# Patient Record
Sex: FEMALE | URBAN - METROPOLITAN AREA
[De-identification: names, ages, dates, MRNs, and addresses within clinical notes are randomized per-mention and may not be internally consistent; named-entity substitution may affect disease eponyms.]

---

## 2022-07-01 ENCOUNTER — OFFICE VISIT (OUTPATIENT)
Dept: URBAN - METROPOLITAN AREA CLINIC 72 | Facility: CLINIC | Age: 77
End: 2022-07-01

## 2022-08-03 ENCOUNTER — WEB ENCOUNTER (OUTPATIENT)
Dept: URBAN - METROPOLITAN AREA CLINIC 113 | Facility: CLINIC | Age: 77
End: 2022-08-03

## 2022-08-03 ENCOUNTER — OFFICE VISIT (OUTPATIENT)
Dept: URBAN - METROPOLITAN AREA CLINIC 113 | Facility: CLINIC | Age: 77
End: 2022-08-03

## 2022-08-05 ENCOUNTER — OFFICE VISIT (OUTPATIENT)
Dept: URBAN - METROPOLITAN AREA CLINIC 113 | Facility: CLINIC | Age: 77
End: 2022-08-05
Payer: MEDICARE

## 2022-08-05 VITALS
HEIGHT: 61 IN | TEMPERATURE: 98 F | SYSTOLIC BLOOD PRESSURE: 149 MMHG | HEART RATE: 74 BPM | WEIGHT: 167 LBS | DIASTOLIC BLOOD PRESSURE: 70 MMHG | BODY MASS INDEX: 31.53 KG/M2

## 2022-08-05 DIAGNOSIS — Z79.899 HIGH RISK MEDICATIONS (NOT ANTICOAGULANTS) LONG-TERM USE: ICD-10-CM

## 2022-08-05 DIAGNOSIS — K21.9 GERD WITHOUT ESOPHAGITIS: ICD-10-CM

## 2022-08-05 DIAGNOSIS — K44.9 HIATAL HERNIA: ICD-10-CM

## 2022-08-05 PROCEDURE — 99203 OFFICE O/P NEW LOW 30 MIN: CPT | Performed by: INTERNAL MEDICINE

## 2022-08-05 RX ORDER — PREDNISONE 5 MG/1
1 TABLET TABLET ORAL ONCE A DAY
Status: ACTIVE | COMMUNITY

## 2022-08-05 RX ORDER — PANTOPRAZOLE SODIUM 20 MG/1
1 TABLET TABLET, DELAYED RELEASE ORAL ONCE A DAY
Status: ACTIVE | COMMUNITY

## 2022-08-05 RX ORDER — CLOPIDOGREL BISULFATE 75 MG/1
1 TABLET TABLET ORAL ONCE A DAY
Status: ACTIVE | COMMUNITY

## 2022-08-05 RX ORDER — LEVOTHYROXINE SODIUM 100 UG/1
1 CAPSULE IN THE MORNING ON AN EMPTY STOMACH CAPSULE ORAL ONCE A DAY
Status: ACTIVE | COMMUNITY

## 2022-08-05 RX ORDER — FAMOTIDINE 20 MG/1
1 TABLET AT BEDTIME AS NEEDED TABLET, FILM COATED ORAL ONCE A DAY
Qty: 30 | OUTPATIENT
Start: 2022-08-25

## 2022-08-05 RX ORDER — GINGER ROOT/GINGER ROOT EXT 262.5 MG
1 TABLET WITH A MEAL CAPSULE ORAL ONCE A DAY
Status: ACTIVE | COMMUNITY

## 2022-08-05 RX ORDER — PRAVASTATIN SODIUM 10 MG/1
1 TABLET TABLET ORAL ONCE A DAY
Status: ACTIVE | COMMUNITY

## 2022-08-05 RX ORDER — TETRACYCLINE HCL 500 MG
AS DIRECTED CAPSULE ORAL
Status: ACTIVE | COMMUNITY

## 2022-08-05 RX ORDER — LOSARTAN POTASSIUM 25 MG/1
1 TABLET TABLET ORAL ONCE A DAY
Status: ACTIVE | COMMUNITY

## 2022-08-05 RX ORDER — METHOTREXATE 25 MG/ML
AS DIRECTED INJECTION, SOLUTION INTRA-ARTERIAL; INTRAMUSCULAR; INTRAVENOUS
Status: ACTIVE | COMMUNITY

## 2022-08-05 RX ORDER — PANTOPRAZOLE SODIUM 20 MG/1
1 TABLET TABLET, DELAYED RELEASE ORAL ONCE A DAY
OUTPATIENT

## 2022-08-05 RX ORDER — FOLIC ACID 1 MG/1
1 TABLET TABLET ORAL ONCE A DAY
Status: ACTIVE | COMMUNITY

## 2022-08-05 RX ORDER — CYANOCOBALAMIN (VITAMIN B-12) 500 MCG
1 TABLET TABLET ORAL ONCE A DAY
Status: ACTIVE | COMMUNITY

## 2022-08-05 NOTE — HPI-TODAY'S VISIT:
Ms. Wen is a 76-year-old woman with a past medical history of peripheral vascular disease on Plavix and rheumatoid arthritis on prednisone and methotrexate presenting for evaluation regarding hiatal hernia and GERD.  She presents records for review.  She had an EGD by Dr. Gil at [facility] on 7/1/2022 that was notable for 7 cm hiatal hernia (Hill grade 4) with no evidence of Rivera's esophagus or esophagitis and duodenal diverticulum.  She was recommended to consider LINX versus hiatal hernia repair with TIF pending esophageal manometry depending on her clinical course.  She had a CT abdomen pelvis with contrast on 5/12/2022 that was unremarkable (no report of a hiatal hernia) she had a EGD and colonoscopy by Dr. Del Castillo in [facility] on 1/15/2013.  EGD with for a 5 sonometer hiatal hernia but no other abnormalities.  He colonoscopy and a excellent bowel prep and was normal.  She reports having GERD for many years, but in the last 1 to 2 years increased amounts of gastroesophageal reflux.  She denies any dysphagia.  Her symptoms are fairly well controlled on pantoprazole 20 mg daily, but she does experience occasional nocturnal symptoms.  She denies any abdominal pain, nausea, vomiting.  Bowel habits are regular normal consistency.  She has occasional bouts of diarrhea after eating fried foods.  No melena or red blood per rectum.  She recently had a stool Cologuard test that was negative.  She is requesting an opinion regarding whether or not she should proceed forward with antireflux surgery.

## 2023-04-21 ENCOUNTER — LAB OUTSIDE AN ENCOUNTER (OUTPATIENT)
Dept: URBAN - METROPOLITAN AREA CLINIC 113 | Facility: CLINIC | Age: 78
End: 2023-04-21

## 2023-04-21 ENCOUNTER — OFFICE VISIT (OUTPATIENT)
Dept: URBAN - METROPOLITAN AREA CLINIC 113 | Facility: CLINIC | Age: 78
End: 2023-04-21
Payer: MEDICARE

## 2023-04-21 VITALS
BODY MASS INDEX: 29.64 KG/M2 | RESPIRATION RATE: 16 BRPM | DIASTOLIC BLOOD PRESSURE: 61 MMHG | HEIGHT: 61 IN | SYSTOLIC BLOOD PRESSURE: 137 MMHG | HEART RATE: 71 BPM | TEMPERATURE: 97.7 F | WEIGHT: 157 LBS

## 2023-04-21 DIAGNOSIS — K44.9 HIATAL HERNIA: ICD-10-CM

## 2023-04-21 DIAGNOSIS — K21.9 GERD WITHOUT ESOPHAGITIS: ICD-10-CM

## 2023-04-21 DIAGNOSIS — R11.0 NAUSEA: ICD-10-CM

## 2023-04-21 PROCEDURE — 99214 OFFICE O/P EST MOD 30 MIN: CPT | Performed by: NURSE PRACTITIONER

## 2023-04-21 RX ORDER — LEVOTHYROXINE SODIUM 100 UG/1
1 CAPSULE IN THE MORNING ON AN EMPTY STOMACH CAPSULE ORAL ONCE A DAY
Status: ACTIVE | COMMUNITY

## 2023-04-21 RX ORDER — FOLIC ACID 1 MG/1
1 TABLET TABLET ORAL ONCE A DAY
Status: ACTIVE | COMMUNITY

## 2023-04-21 RX ORDER — GINGER ROOT/GINGER ROOT EXT 262.5 MG
1 TABLET WITH A MEAL CAPSULE ORAL ONCE A DAY
Status: ACTIVE | COMMUNITY

## 2023-04-21 RX ORDER — METHOTREXATE 25 MG/ML
AS DIRECTED INJECTION, SOLUTION INTRA-ARTERIAL; INTRAMUSCULAR; INTRAVENOUS
Status: ACTIVE | COMMUNITY

## 2023-04-21 RX ORDER — PREDNISONE 5 MG/1
1 TABLET TABLET ORAL ONCE A DAY
Status: ACTIVE | COMMUNITY

## 2023-04-21 RX ORDER — LOSARTAN POTASSIUM 25 MG/1
1 TABLET TABLET ORAL ONCE A DAY
Status: ACTIVE | COMMUNITY

## 2023-04-21 RX ORDER — CLOPIDOGREL BISULFATE 75 MG/1
1 TABLET TABLET ORAL ONCE A DAY
Status: ACTIVE | COMMUNITY

## 2023-04-21 RX ORDER — FAMOTIDINE 20 MG/1
1 TABLET AT BEDTIME AS NEEDED TABLET, FILM COATED ORAL ONCE A DAY
Qty: 30 | Status: ON HOLD | COMMUNITY
Start: 2022-08-25

## 2023-04-21 RX ORDER — TETRACYCLINE HCL 500 MG
AS DIRECTED CAPSULE ORAL
Status: ACTIVE | COMMUNITY

## 2023-04-21 RX ORDER — ONDANSETRON 8 MG/1
1 TABLET ON THE TONGUE AND ALLOW TO DISSOLVE  AS NEEDED TABLET, ORALLY DISINTEGRATING ORAL EVERY 6 HOURS
Qty: 60 TABLETS | Refills: 3 | OUTPATIENT
Start: 2023-04-21

## 2023-04-21 RX ORDER — PANTOPRAZOLE SODIUM 20 MG/1
1 TABLET TABLET, DELAYED RELEASE ORAL ONCE A DAY
Status: ACTIVE | COMMUNITY

## 2023-04-21 RX ORDER — CYANOCOBALAMIN (VITAMIN B-12) 500 MCG
1 TABLET TABLET ORAL ONCE A DAY
Status: ACTIVE | COMMUNITY

## 2023-04-21 RX ORDER — PRAVASTATIN SODIUM 10 MG/1
1 TABLET TABLET ORAL ONCE A DAY
Status: ACTIVE | COMMUNITY

## 2023-04-21 RX ORDER — PANTOPRAZOLE SODIUM 40 MG/1
1 TABLET TABLET, DELAYED RELEASE ORAL ONCE A DAY
Qty: 30 TABLET | Refills: 3 | OUTPATIENT
Start: 2023-04-21

## 2023-04-21 NOTE — HPI-TODAY'S VISIT:
Ms. Wen is a 77-year-old woman with a past medical history of peripheral vascular disease on Plavix and rheumatoid arthritis on prednisone and methotrexate presenting for ER follow-up of abdominal pain and nausea.

## 2023-04-21 NOTE — HPI-TODAY'S VISIT:
She was last seen 8/5/2022 for evaluation of a large hiatal hernia with GERD, but no esophagitis or Rivera's esophagus.  Her symptoms were fairly well controlled with pantoprazole 20 mg daily.  She declined consideration for hiatal hernia repair or antireflux surgery at that time.  She was recommended a trial of nightly famotidine for occasional breakthrough symptoms. Per review of records, she was seen in the ER at [facility] on 4/19/2023 due to epigastric pain and nausea.  Lab evaluation at that time, to include CBC, CMP, lipase, troponin level, and urinalysis, was unremarkable.  She had an EKG and abdominal xray at that time which also showed no acute process. Her symptoms were ultimately attributed to GERD. She states she has been nauseous since Tuesday night, three days ago. She took Zofran at home and was unrelieved. She has experienced a constant queasiness in the upper abdomen, but has not vomited. She was seen in the ER as above and given IV Pepcid and Zofran with little relief. She denies any reflux symptoms on pantoprazole. Her bowel habits are regular without blood per rectum. She is following a bland diet and eating very little, as most food exacerbates her nausea. Interestingly, over the last month, she has experienced an exacerbation of nausea every Tuesday following administration of her methotrexate on Monday. This typically responds to ODT ondansetron 4mg, however, this week her symptoms have persisted. She denies NSAID use. She does report poor dietary habits prior to onset, including consumption of fried chicken, french fries, and a [employer] hotdog.

## 2023-04-21 NOTE — HPI-OTHER HISTORIES
Negative Cologuard 2022. She presents records for review.  She had an EGD by Dr. Gil at [facility] on 7/1/2022 that was notable for 7 cm hiatal hernia (Hill grade 4) with no evidence of Rivera's esophagus or esophagitis and duodenal diverticulum.  She was recommended to consider LINX versus hiatal hernia repair with TIF pending esophageal manometry depending on her clinical course.  She had a CT abdomen pelvis with contrast on 5/12/2022 that was unremarkable (no report of a hiatal hernia) she had a EGD and colonoscopy by Dr. Del Castillo in [facility] on 1/15/2013.  EGD with for a 5 sonometer hiatal hernia but no other abnormalities.  He colonoscopy and a excellent bowel prep and was normal.

## 2023-06-01 ENCOUNTER — OFFICE VISIT (OUTPATIENT)
Dept: URBAN - METROPOLITAN AREA CLINIC 113 | Facility: CLINIC | Age: 78
End: 2023-06-01

## 2023-06-12 ENCOUNTER — OFFICE VISIT (OUTPATIENT)
Dept: URBAN - METROPOLITAN AREA CLINIC 113 | Facility: CLINIC | Age: 78
End: 2023-06-12
Payer: MEDICARE

## 2023-06-12 VITALS
HEART RATE: 75 BPM | HEIGHT: 61 IN | BODY MASS INDEX: 29.64 KG/M2 | DIASTOLIC BLOOD PRESSURE: 66 MMHG | WEIGHT: 157 LBS | SYSTOLIC BLOOD PRESSURE: 154 MMHG | TEMPERATURE: 97.3 F

## 2023-06-12 DIAGNOSIS — K76.0 FATTY LIVER: ICD-10-CM

## 2023-06-12 DIAGNOSIS — K21.9 GASTRO-ESOPHAGEAL REFLUX DISEASE WITHOUT ESOPHAGITIS: ICD-10-CM

## 2023-06-12 DIAGNOSIS — K44.9 DIAPHRAGMATIC HERNIA WITHOUT OBSTRUCTION OR GANGRENE: ICD-10-CM

## 2023-06-12 PROCEDURE — 99213 OFFICE O/P EST LOW 20 MIN: CPT | Performed by: INTERNAL MEDICINE

## 2023-06-12 RX ORDER — LEVOTHYROXINE SODIUM 100 UG/1
1 CAPSULE IN THE MORNING ON AN EMPTY STOMACH CAPSULE ORAL ONCE A DAY
Status: ON HOLD | COMMUNITY

## 2023-06-12 RX ORDER — PANTOPRAZOLE SODIUM 20 MG/1
1 TABLET TABLET, DELAYED RELEASE ORAL TWICE A DAY
Qty: 180 TABLET | Refills: 3 | OUTPATIENT

## 2023-06-12 RX ORDER — LOSARTAN POTASSIUM 25 MG/1
1 TABLET TABLET ORAL ONCE A DAY
Status: ACTIVE | COMMUNITY

## 2023-06-12 RX ORDER — FAMOTIDINE 20 MG/1
1 TABLET AT BEDTIME AS NEEDED TABLET, FILM COATED ORAL ONCE A DAY
Qty: 30 | Status: ON HOLD | COMMUNITY
Start: 2022-08-25

## 2023-06-12 RX ORDER — PANTOPRAZOLE SODIUM 40 MG/1
1 TABLET TABLET, DELAYED RELEASE ORAL ONCE A DAY
Qty: 30 TABLET | Refills: 3 | Status: ON HOLD | COMMUNITY
Start: 2023-04-21

## 2023-06-12 RX ORDER — TETRACYCLINE HCL 500 MG
AS DIRECTED CAPSULE ORAL
Status: ACTIVE | COMMUNITY

## 2023-06-12 RX ORDER — METHOTREXATE 25 MG/ML
AS DIRECTED INJECTION, SOLUTION INTRA-ARTERIAL; INTRAMUSCULAR; INTRAVENOUS
Status: ACTIVE | COMMUNITY

## 2023-06-12 RX ORDER — CYANOCOBALAMIN (VITAMIN B-12) 500 MCG
1 TABLET TABLET ORAL ONCE A DAY
Status: ACTIVE | COMMUNITY

## 2023-06-12 RX ORDER — ONDANSETRON 8 MG/1
1 TABLET ON THE TONGUE AND ALLOW TO DISSOLVE  AS NEEDED TABLET, ORALLY DISINTEGRATING ORAL EVERY 6 HOURS
Qty: 60 TABLETS | Refills: 3 | Status: ACTIVE | COMMUNITY
Start: 2023-04-21

## 2023-06-12 RX ORDER — PREDNISONE 5 MG/1
1 TABLET TABLET ORAL ONCE A DAY
Status: ACTIVE | COMMUNITY

## 2023-06-12 RX ORDER — CLOPIDOGREL BISULFATE 75 MG/1
1 TABLET TABLET ORAL ONCE A DAY
Status: ACTIVE | COMMUNITY

## 2023-06-12 RX ORDER — FOLIC ACID 1 MG/1
1 TABLET TABLET ORAL ONCE A DAY
Status: ACTIVE | COMMUNITY

## 2023-06-12 RX ORDER — PANTOPRAZOLE SODIUM 20 MG/1
1 TABLET TABLET, DELAYED RELEASE ORAL ONCE A DAY
Status: ACTIVE | COMMUNITY

## 2023-06-12 RX ORDER — GINGER ROOT/GINGER ROOT EXT 262.5 MG
1 TABLET WITH A MEAL CAPSULE ORAL ONCE A DAY
Status: ACTIVE | COMMUNITY

## 2023-06-12 RX ORDER — PRAVASTATIN SODIUM 10 MG/1
1 TABLET TABLET ORAL ONCE A DAY
Status: ACTIVE | COMMUNITY

## 2023-06-12 NOTE — HPI-TODAY'S VISIT:
Ms. Wen is a 77-year-old woman with a past medical history of peripheral vascular disease on Plavix and rheumatoid arthritis on prednisone and methotrexate presenting for follow up.  She was last seen on 4/21/2023 for follow-up after ER visit for worsening nausea and GERD.  She is recommended to have an upper GI series and abdominal ultrasound, and she was started on pantoprazole 40 mg daily for GERD, and Zofran for nausea.  The abdominal ultrasound on 4/28/2023 showed changes of hepatic steatosis and bilateral renal cysts.  The upper GI series on 5/5/1932 was notable for a moderate size hiatal hernia and mild distal thoracic esophageal spasm.  She returns for follow-up reporting that she is overall feeling better since she has intentionally lost weight with avoiding fried foods and making other healthier choices with meals.  She denies any nausea, vomiting or heartburn.  She has not been using Zofran.  She has not been taking any NSAIDs, but may use up to Tylenol 3 g daily for relief of musculoskeletal pain.  She is taking pantoprazole 20 mg twice daily.  No dysphagia.  She has taken Pepcid in the past without any relief of GERD symptoms.  Her bowel habits are fairly regular normal consistency.  No melena or red blood per rectum.

## 2023-06-12 NOTE — HPI-OTHER HISTORIES
Negative Cologuard 2022. She presents records for review.  She had an EGD by Dr. Gil at [facility] on 7/1/2022 that was notable for 7 cm hiatal hernia (Hill grade 4) with no evidence of Rivera's esophagus or esophagitis and duodenal diverticulum.  She was recommended to consider LINX versus hiatal hernia repair with TIF pending esophageal manometry depending on her clinical course.  She had a CT abdomen pelvis with contrast on 5/12/2022 that was unremarkable (no report of a hiatal hernia) she had a EGD and colonoscopy by Dr. Del Castillo in [facility] on 1/15/2013.  EGD with for a 5 sonometer hiatal hernia but no other abnormalities.  He colonoscopy and a excellent bowel prep and was normal.

## 2023-06-13 ENCOUNTER — OFFICE VISIT (OUTPATIENT)
Dept: URBAN - METROPOLITAN AREA CLINIC 113 | Facility: CLINIC | Age: 78
End: 2023-06-13

## 2023-06-22 ENCOUNTER — TELEPHONE ENCOUNTER (OUTPATIENT)
Dept: URBAN - METROPOLITAN AREA CLINIC 113 | Facility: CLINIC | Age: 78
End: 2023-06-22

## 2023-06-22 RX ORDER — PANTOPRAZOLE SODIUM 20 MG/1
1 TABLET TABLET, DELAYED RELEASE ORAL TWICE DAILY
Qty: 180 | Refills: 3

## 2023-06-29 PROBLEM — 371132002: Status: ACTIVE | Noted: 2023-06-29

## 2023-06-29 PROBLEM — 235595009: Status: ACTIVE | Noted: 2023-06-29

## 2023-06-29 PROBLEM — 197321007: Status: ACTIVE | Noted: 2023-06-29

## 2023-07-19 ENCOUNTER — ERX REFILL RESPONSE (OUTPATIENT)
Dept: URBAN - METROPOLITAN AREA CLINIC 113 | Facility: CLINIC | Age: 78
End: 2023-07-19

## 2023-07-19 RX ORDER — PANTOPRAZOLE SODIUM 40 MG/1
1 TABLET TABLET, DELAYED RELEASE ORAL ONCE A DAY
Qty: 30 TABLET | Refills: 3 | OUTPATIENT

## 2023-07-19 RX ORDER — PANTOPRAZOLE SODIUM 40 MG/1
TAKE 1 TABLET BY MOUTH EVERY DAY FOR 30 DAYS TABLET, DELAYED RELEASE ORAL
Qty: 90 TABLET | Refills: 3 | OUTPATIENT

## 2023-11-06 ENCOUNTER — LAB OUTSIDE AN ENCOUNTER (OUTPATIENT)
Dept: URBAN - METROPOLITAN AREA CLINIC 113 | Facility: CLINIC | Age: 78
End: 2023-11-06

## 2024-03-26 ENCOUNTER — LAB (OUTPATIENT)
Dept: URBAN - METROPOLITAN AREA CLINIC 113 | Facility: CLINIC | Age: 79
End: 2024-03-26

## 2024-03-26 ENCOUNTER — OV EP (OUTPATIENT)
Dept: URBAN - METROPOLITAN AREA CLINIC 113 | Facility: CLINIC | Age: 79
End: 2024-03-26

## 2024-03-26 VITALS
RESPIRATION RATE: 18 BRPM | TEMPERATURE: 97.8 F | HEIGHT: 61 IN | SYSTOLIC BLOOD PRESSURE: 174 MMHG | BODY MASS INDEX: 27 KG/M2 | WEIGHT: 143 LBS | HEART RATE: 74 BPM | DIASTOLIC BLOOD PRESSURE: 74 MMHG

## 2024-03-26 RX ORDER — PANTOPRAZOLE SODIUM 20 MG/1
1 TABLET TABLET, DELAYED RELEASE ORAL TWICE DAILY
Qty: 180 | Refills: 3 | Status: ACTIVE | COMMUNITY

## 2024-03-26 RX ORDER — ONDANSETRON 8 MG/1
1 TABLET ON THE TONGUE AND ALLOW TO DISSOLVE  AS NEEDED TABLET, ORALLY DISINTEGRATING ORAL EVERY 6 HOURS
Qty: 60 TABLETS | Refills: 3 | Status: ACTIVE | COMMUNITY
Start: 2023-04-21

## 2024-03-26 RX ORDER — CYANOCOBALAMIN (VITAMIN B-12) 500 MCG
1 TABLET TABLET ORAL ONCE A DAY
Status: ACTIVE | COMMUNITY

## 2024-03-26 RX ORDER — PREDNISONE 5 MG/1
1 TABLET TABLET ORAL ONCE A DAY
Status: ACTIVE | COMMUNITY

## 2024-03-26 RX ORDER — METHOTREXATE 25 MG/ML
AS DIRECTED INJECTION, SOLUTION INTRA-ARTERIAL; INTRAMUSCULAR; INTRAVENOUS
Status: ACTIVE | COMMUNITY

## 2024-03-26 RX ORDER — GINGER ROOT/GINGER ROOT EXT 262.5 MG
1 TABLET WITH A MEAL CAPSULE ORAL ONCE A DAY
Status: ACTIVE | COMMUNITY

## 2024-03-26 RX ORDER — LEVOTHYROXINE SODIUM 100 UG/1
1 TABLET IN THE MORNING ON AN EMPTY STOMACH TABLET ORAL ONCE A DAY
Status: ACTIVE | COMMUNITY

## 2024-03-26 RX ORDER — CLOPIDOGREL BISULFATE 75 MG/1
1 TABLET TABLET ORAL ONCE A DAY
Status: ACTIVE | COMMUNITY

## 2024-03-26 RX ORDER — FOLIC ACID 1 MG/1
1 TABLET TABLET ORAL ONCE A DAY
Status: ACTIVE | COMMUNITY

## 2024-03-26 RX ORDER — TETRACYCLINE HCL 500 MG
AS DIRECTED CAPSULE ORAL
Status: ACTIVE | COMMUNITY

## 2024-03-26 RX ORDER — PRAVASTATIN SODIUM 10 MG/1
1 TABLET TABLET ORAL ONCE A DAY
Status: ACTIVE | COMMUNITY

## 2024-03-26 RX ORDER — LOSARTAN POTASSIUM 25 MG/1
1 TABLET TABLET ORAL ONCE A DAY
Status: ACTIVE | COMMUNITY

## 2024-03-26 RX ORDER — PANTOPRAZOLE SODIUM 20 MG/1
1 TABLET TABLET, DELAYED RELEASE ORAL TWICE A DAY
Qty: 180 TABLET | Refills: 3 | OUTPATIENT

## 2024-03-26 NOTE — HPI-TODAY'S VISIT:
Mrs. Pedroza is a 78-year-old woman with past medical history of peripheral pressure when she is on Plavix and rheumatoid arthritis on prednisone and methotrexate presenting for evaluation of abdominal pain.  She was last seen in office on 6/12/2023 for follow-up regarding her to mostly well-controlled with recent intentional weight loss and continues with pantoprazole 20 mg twice daily.  No change in management at this time.  Regarding her nonalcoholic fatty liver disease we will continue to follow LFT and trend every 6 months.  No stigmata of liver disease. She's been experiencing nausea, abdominal pain about 6 weeks ago. She has gone to her endocrinologist, who recommended she trialed probiotic, so her bowels are moving more regularly. Upper abdominal pain.  cologsebastien, last year, for screening.

## 2024-05-01 ENCOUNTER — OUT OF OFFICE VISIT (OUTPATIENT)
Dept: URBAN - METROPOLITAN AREA SURGERY CENTER 25 | Facility: SURGERY CENTER | Age: 79
End: 2024-05-01
Payer: MEDICARE

## 2024-05-01 ENCOUNTER — CLAIMS CREATED FROM THE CLAIM WINDOW (OUTPATIENT)
Dept: URBAN - METROPOLITAN AREA CLINIC 4 | Facility: CLINIC | Age: 79
End: 2024-05-01
Payer: MEDICARE

## 2024-05-01 DIAGNOSIS — K21.9 GASTRO-ESOPHAGEAL REFLUX DISEASE WITHOUT ESOPHAGITIS: ICD-10-CM

## 2024-05-01 DIAGNOSIS — K31.89 REACTIVE GASTROPATHY: ICD-10-CM

## 2024-05-01 DIAGNOSIS — K31.89 OTHER DISEASES OF STOMACH AND DUODENUM: ICD-10-CM

## 2024-05-01 DIAGNOSIS — K44.9 HIATAL HERNIA: ICD-10-CM

## 2024-05-01 DIAGNOSIS — R10.13 EPIGASTRIC PAIN: ICD-10-CM

## 2024-05-01 DIAGNOSIS — K22.89 OTHER SPECIFIED DISEASE OF ESOPHAGUS: ICD-10-CM

## 2024-05-01 DIAGNOSIS — K22.9 IRREGULAR Z LINE OF ESOPHAGUS: ICD-10-CM

## 2024-05-01 DIAGNOSIS — K29.70 GASTRITIS, UNSPECIFIED, WITHOUT BLEEDING: ICD-10-CM

## 2024-05-01 PROCEDURE — 00731 ANES UPR GI NDSC PX NOS: CPT | Performed by: ANESTHESIOLOGY

## 2024-05-01 PROCEDURE — 88305 TISSUE EXAM BY PATHOLOGIST: CPT | Performed by: STUDENT IN AN ORGANIZED HEALTH CARE EDUCATION/TRAINING PROGRAM

## 2024-05-01 PROCEDURE — G8907 PT DOC NO EVENTS ON DISCHARG: HCPCS | Performed by: CLINIC/CENTER

## 2024-05-01 PROCEDURE — 00731 ANES UPR GI NDSC PX NOS: CPT | Performed by: NURSE ANESTHETIST, CERTIFIED REGISTERED

## 2024-05-01 PROCEDURE — 43239 EGD BIOPSY SINGLE/MULTIPLE: CPT | Performed by: CLINIC/CENTER

## 2024-05-01 PROCEDURE — 43239 EGD BIOPSY SINGLE/MULTIPLE: CPT | Performed by: INTERNAL MEDICINE

## 2024-05-01 PROCEDURE — 88342 IMHCHEM/IMCYTCHM 1ST ANTB: CPT | Performed by: STUDENT IN AN ORGANIZED HEALTH CARE EDUCATION/TRAINING PROGRAM

## 2024-05-01 RX ORDER — METHOTREXATE 25 MG/ML
AS DIRECTED INJECTION, SOLUTION INTRA-ARTERIAL; INTRAMUSCULAR; INTRAVENOUS
Status: ACTIVE | COMMUNITY

## 2024-05-01 RX ORDER — PRAVASTATIN SODIUM 10 MG/1
1 TABLET TABLET ORAL ONCE A DAY
Status: ACTIVE | COMMUNITY

## 2024-05-01 RX ORDER — CLOPIDOGREL BISULFATE 75 MG/1
1 TABLET TABLET ORAL ONCE A DAY
Status: ACTIVE | COMMUNITY

## 2024-05-01 RX ORDER — PANTOPRAZOLE SODIUM 20 MG/1
1 TABLET TABLET, DELAYED RELEASE ORAL TWICE A DAY
Qty: 180 TABLET | Refills: 3 | Status: ACTIVE | COMMUNITY

## 2024-05-01 RX ORDER — FOLIC ACID 1 MG/1
1 TABLET TABLET ORAL ONCE A DAY
Status: ACTIVE | COMMUNITY

## 2024-05-01 RX ORDER — CYANOCOBALAMIN (VITAMIN B-12) 500 MCG
1 TABLET TABLET ORAL ONCE A DAY
Status: ACTIVE | COMMUNITY

## 2024-05-01 RX ORDER — GINGER ROOT/GINGER ROOT EXT 262.5 MG
1 TABLET WITH A MEAL CAPSULE ORAL ONCE A DAY
Status: ACTIVE | COMMUNITY

## 2024-05-01 RX ORDER — PREDNISONE 5 MG/1
1 TABLET TABLET ORAL ONCE A DAY
Status: ACTIVE | COMMUNITY

## 2024-05-01 RX ORDER — LEVOTHYROXINE SODIUM 100 UG/1
1 TABLET IN THE MORNING ON AN EMPTY STOMACH TABLET ORAL ONCE A DAY
Status: ACTIVE | COMMUNITY

## 2024-05-01 RX ORDER — ONDANSETRON 8 MG/1
1 TABLET ON THE TONGUE AND ALLOW TO DISSOLVE  AS NEEDED TABLET, ORALLY DISINTEGRATING ORAL EVERY 6 HOURS
Qty: 60 TABLETS | Refills: 3 | Status: ACTIVE | COMMUNITY
Start: 2023-04-21

## 2024-05-01 RX ORDER — LOSARTAN POTASSIUM 25 MG/1
1 TABLET TABLET ORAL ONCE A DAY
Status: ACTIVE | COMMUNITY

## 2024-05-01 RX ORDER — TETRACYCLINE HCL 500 MG
AS DIRECTED CAPSULE ORAL
Status: ACTIVE | COMMUNITY

## 2024-05-28 ENCOUNTER — ERX REFILL RESPONSE (OUTPATIENT)
Dept: URBAN - METROPOLITAN AREA CLINIC 113 | Facility: CLINIC | Age: 79
End: 2024-05-28

## 2024-05-28 RX ORDER — PANTOPRAZOLE SODIUM 20 MG/1
1 TABLET TABLET, DELAYED RELEASE ORAL TWICE A DAY
Qty: 180 TABLET | Refills: 3 | OUTPATIENT

## 2024-06-10 ENCOUNTER — OFFICE VISIT (OUTPATIENT)
Dept: URBAN - METROPOLITAN AREA CLINIC 113 | Facility: CLINIC | Age: 79
End: 2024-06-10
Payer: MEDICARE

## 2024-06-10 ENCOUNTER — DASHBOARD ENCOUNTERS (OUTPATIENT)
Age: 79
End: 2024-06-10

## 2024-06-10 VITALS
HEIGHT: 61 IN | SYSTOLIC BLOOD PRESSURE: 168 MMHG | HEART RATE: 66 BPM | BODY MASS INDEX: 26.09 KG/M2 | TEMPERATURE: 97.7 F | DIASTOLIC BLOOD PRESSURE: 73 MMHG | WEIGHT: 138.2 LBS | RESPIRATION RATE: 20 BRPM

## 2024-06-10 DIAGNOSIS — K44.9 DIAPHRAGMATIC HERNIA WITHOUT OBSTRUCTION OR GANGRENE: ICD-10-CM

## 2024-06-10 DIAGNOSIS — K76.0 FATTY LIVER: ICD-10-CM

## 2024-06-10 DIAGNOSIS — K21.9 GASTRO-ESOPHAGEAL REFLUX DISEASE WITHOUT ESOPHAGITIS: ICD-10-CM

## 2024-06-10 PROCEDURE — 99214 OFFICE O/P EST MOD 30 MIN: CPT

## 2024-06-10 RX ORDER — CYANOCOBALAMIN (VITAMIN B-12) 500 MCG
1 TABLET TABLET ORAL ONCE A DAY
Status: ACTIVE | COMMUNITY

## 2024-06-10 RX ORDER — LEVOTHYROXINE SODIUM 100 UG/1
1 TABLET IN THE MORNING ON AN EMPTY STOMACH TABLET ORAL ONCE A DAY
Status: ACTIVE | COMMUNITY

## 2024-06-10 RX ORDER — PANTOPRAZOLE SODIUM 20 MG/1
1 TABLET TABLET, DELAYED RELEASE ORAL TWICE A DAY
Qty: 180 TABLET | Refills: 3 | Status: ACTIVE | COMMUNITY

## 2024-06-10 RX ORDER — METHOTREXATE 25 MG/ML
AS DIRECTED INJECTION, SOLUTION INTRA-ARTERIAL; INTRAMUSCULAR; INTRAVENOUS
Status: ACTIVE | COMMUNITY

## 2024-06-10 RX ORDER — CLOPIDOGREL BISULFATE 75 MG/1
1 TABLET TABLET ORAL ONCE A DAY
Status: ACTIVE | COMMUNITY

## 2024-06-10 RX ORDER — PRAVASTATIN SODIUM 10 MG/1
1 TABLET TABLET ORAL ONCE A DAY
Status: ACTIVE | COMMUNITY

## 2024-06-10 RX ORDER — ONDANSETRON 8 MG/1
1 TABLET ON THE TONGUE AND ALLOW TO DISSOLVE  AS NEEDED TABLET, ORALLY DISINTEGRATING ORAL EVERY 6 HOURS
Qty: 60 TABLETS | Refills: 3 | Status: ACTIVE | COMMUNITY
Start: 2023-04-21

## 2024-06-10 RX ORDER — LOSARTAN POTASSIUM 25 MG/1
1 TABLET TABLET ORAL ONCE A DAY
Status: ACTIVE | COMMUNITY

## 2024-06-10 RX ORDER — PANTOPRAZOLE SODIUM 20 MG/1
1 TABLET TABLET, DELAYED RELEASE ORAL TWICE A DAY
Qty: 180 TABLET | Refills: 3 | OUTPATIENT

## 2024-06-10 RX ORDER — FOLIC ACID 1 MG/1
1 TABLET TABLET ORAL ONCE A DAY
Status: ACTIVE | COMMUNITY

## 2024-06-10 RX ORDER — GINGER ROOT/GINGER ROOT EXT 262.5 MG
1 TABLET WITH A MEAL CAPSULE ORAL ONCE A DAY
Status: ACTIVE | COMMUNITY

## 2024-06-10 RX ORDER — TETRACYCLINE HCL 500 MG
AS DIRECTED CAPSULE ORAL
Status: ACTIVE | COMMUNITY

## 2024-06-10 RX ORDER — PREDNISONE 5 MG/1
1 TABLET TABLET ORAL ONCE A DAY
Status: ACTIVE | COMMUNITY

## 2024-06-10 NOTE — HPI-TODAY'S VISIT:
Ms. Wen is a 78-year-old woman past medical history significant for peripheral ulcer disease she is on Plavix, rheumatoid arthritis on prednisone and methotrexate, hypothyroidism presenting for for follow-up after EGD.  She was last seen in office on 3/26/2024 for long interval follow-up with complaints of worsening GERD symptoms, upper abdominal pain.  When history of diaphragmatic hernia and EGD was scheduled, consider esophageal manometry pending results.  She is to continue her twice daily PPI therapy.  Regarding her nonalcoholic fatty liver disease.  Most recent LFTs were normal.  Pending results of EGD consider abdominal ultrasound.  Her EGD performed on 5/1/2024 showed irregular Z-line with normal esophagus.  6 cm hiatal hernia.  Gastric antral erosions.  This was biopsied.  Normal duodenum.  Pathology revealed mild chemical/reactive gastropathy.  No evidence of H. pylori or intestinal metaplasia.   Today she reports intermittent diarrhea, worse post prandial. She has not been able to identify any modifying factors. She denies abdominal pain. Some nausea when taking her methotrexate. Denies nausea, fevers, or hematemsis. She denies use of NSAIDs. She is concerned since our last visit as she has had an approximate 5-7 pound weight loss. She does feel her thyroid level is low and she has felt fatigued, she plans to follow-up with her Endocrinologist. Her last colonoscopy was in 2013 and normal. She is past due, reports she completed cologuard in 2022 and it was normal.

## 2024-06-10 NOTE — PHYSICAL EXAM EYES:
General Allergic Reactions  An allergic reaction is a set of symptoms caused by an allergen. An allergen is something that causes a persons immune system to react. When a person comes in contact with an allergen, it causes the body to release chemicals. These include the chemical histamine. Histamine causes swelling and itching. It may affect the entire body. This is called a general allergic reaction. Often symptoms affect only 1 part of the body. This is called a local allergic reaction.  You are having an allergic reaction. Almost anything can cause one. Different people are allergic to different things. It is usually something that you ate or swallowed, came into contact with by getting or putting it on your skin or clothes, or something you breathed in the air. This can be very annoying and sometimes scary.  Most of us think of allergic reactions when we have a rash or itchy skin. Symptoms can include:  · Itching of the eyes, nose, and roof of the mouth  · Runny or stuffy nose  · Watery eyes   · Sneezing or coughing   · A blocked feeling in the ear  · Red, itchy rash called hives  · Red and purple spots  · Rash, redness, welts, blisters  · Itching, burning, stinging, pain  · Dry, flaky, cracking, scaly skin  Severe symptoms include:  · Swelling of the face, lips, or other parts of the body  · Hoarse voice  · Trouble swallowing, feeling like your throat is closing  · Trouble breathing, wheezing  · Nausea, vomiting, diarrhea, stomach cramps  · Feeling faint or lightheaded, rapid heart rate  Sometimes the cause may be obvious. But there are so many things that can cause a reaction that you may not be able to figure out. The most important things to help find your allergen are:  · Remembering when it started  · What you were doing at the time or just before that  · Any activities you were involved in  · Any new products or contacts  Below are some common causes. But remember that almost anything can cause a  Sclerae : White without injection , Conjuntivae and eyelids appear normal reaction. You may not even be aware that you came into contact with one of these things:  · Dust, mold, pollen  · Plants (common ones are poison ivy and poison oak, but there are many others)   · Animals  · Foods such as shrimp, shellfish, peanuts, milk products, gluten, and eggs. Also food colorings, flavorings, and additives.  · Insect bites or stings such as bees, mosquitos, fleas, ticks  · Medicines such as penicillin, sulfa medicines, amoxicillin, aspirin, and ibuprofen. But any medicine can cause a reaction.  · Jewelry such as nickel or gold. This can be new, or something youve worn for a while, including zippers and buttons.  · Latex such as in gloves, clothes, toys, balloons, or some tapes. Some people allergic to latex may also have problems with foods like bananas, avocados, kiwi, papaya, or chestnuts.  · Lotions, perfumes, cosmetics, soaps, shampoos, skincare products, nail products  · Chemicals or dyes in clothing, linen, , hair dyes, soaps, iodine  Many viruses and common colds can cause a rash that is not an allergic reaction. Sometimes it is hard to tell the difference between allergies, sensitivity, or an intolerance to something. This is especially true with food. Many things can cause diarrhea, vomiting, stomach cramps, and skin irritation.  Home care    The goal of treatment is to help relieve the symptoms and get you feeling better. The rash will usually fade over several days. But it can sometimes last a couple of weeks. Over the next couple of days, there may be times when it is gets a little worse, and then better again. Here are some things to do:  · If you know what you are allergic to, stay away from it. Future reactions could be worse than this one.  · Avoid tight clothing and anything that heats up your skin (hot showers or baths, direct sunlight). Heat will make itching worse.  · An ice pack will relieve local areas of intense itching and redness. To make an ice pack, put ice  cubes in a plastic bag that seals at the top. Wrap it in a thin, clean towel. Dont put the ice directly on the skin because it can damage the skin.  · Oral diphenhydramine is an over-the-counter antihistamine sold at pharmacy and grocery stores. Unless a prescription antihistamine was given, diphenhydramine may be used to reduce itching if large areas of the skin are involved. It may make you sleepy. So be careful using it in the daytime or when going to school, working, or driving. Note: Dont use diphenhydramine if you have glaucoma or if you are a man with trouble urinating due to an enlarged prostate. There are other antihistamines that wont make you so sleepy. These are good choices for daytime use. Ask your pharmacist for suggestions.  · Dont use diphenhydramine cream on your skin. It can cause a further reaction in some people.  · To help prevent an infection, don't scratch the affected area. Scratching may worsen the reaction and damage your skin. It can also lead to an infection. Always check the affected for signs of an infection.  · Call your healthcare provider and ask what you can use to help decrease the itching.  · To decrease allergic reactions, try the following:    · Use heat-steam to clean your home  · Use high-efficiency particulate (HEPA) vacuums and filters  · Stay away from food and pet triggers  · Kill any cockroaches  · Clean your house often  Follow-up care  Follow up with your healthcare provider, or as advised. If you had a severe reaction today, or if you have had several mild to medium allergic reactions in the past, ask your provider about allergy testing. This can help you find out what you are allergic to. If your reaction included dizziness, fainting, or trouble breathing or swallowing, ask your provider about carrying auto-injectable epinephrine.  Call 911  Call 911 if any of these occur:  · Trouble breathing or swallowing, wheezing  · Cool, moist, pale skin  · Shortness of  breath  · Hoarse voice or trouble speaking  · Confused   · Very drowsy or trouble awakening  · Fainting or loss of consciousness  · Rapid heart rate  · Feeling of dizziness or weakness or a sudden drop in blood pressure  · Feeling of doom  · Feeling lightheaded  · Severe nausea or vomiting, or diarrhea  · Seizure  · Swelling in the face, eyelids, lips, mouth, throat or tongue  · Drooling  When to seek medical advice  Call your healthcare provider right away if any of these occur:  · Spreading areas of itching, redness or swelling  · Nausea or stomach cramps or abdominal pain  · Continuing or recurring symptoms  · Spreading areas of redness, swelling, or itching  · Signs of infection at the affected site:  ¨ Spreading redness  ¨ Increased pain or swelling  ¨ Fluid or colored drainage from the site  ¨ Fever of 100.4°F (38°C) or above lasting for 24 to 48 hours, or as directed by your provider  Date Last Reviewed: 3/1/2017  © 5681-0137 [employer]. [address] [place] [address]. All rights reserved. This information is not intended as a substitute for professional medical care. Always follow your healthcare professional's instructions.        Understanding Contact Dermatitis     A cool, moist compress can help reduce itching.     Contact dermatitis is a common type of skin rash. Its caused by something that touches the skin and makes it irritated and inflamed. It can occur on skin on any part of the body, such as the face, neck, hands, arms, and legs. Contact dermatitis is not spread from person to person.  Often, the reaction of contact dermatitis occurs 1 to 2 days after contact with the offending agent.  How to say it  YESENIA-tact [id number]   What causes contact dermatitis?  Its caused by something that irritates the skin, or that creates an allergic reaction on the skin. People can get contact dermatitis from many kinds of things. These include:  · Plant oils in poison ivy, oak, and  sumac  · Chemicals in household , solvents, and glue  · Chemicals in makeup, soap, laundry detergent, perfume, acne cream, and hair products  · Certain medicines, such as neomycin, bacitracin, benzocaine, and thimerosal  · Metals such as nickel, found in some jewelry and watch bands   · The sticky material on the back of bandages and tape (adhesive)  · Things that can cause tiny breaks in the skin, such as wood, fiberglass, metal tools, and plant thorns  · Rubber latex in surgical gloves and other medical supplies  Dermatitis can also be caused by the skin being damp for long periods of time. This can happen from washing your hands too often, or working with wet materials.  Symptoms of contact dermatitis  Symptoms can include skin that is:  · Blistered  · Burning  · Cracked  · Dry  · Itchy  · Painful  · Red  · Rough, thickened, and leathery  · Swollen  · Warm  The blisters may ooze fluid and form crusts.  Treatment for contact dermatitis  Treatment is done to help relieve itching and reduce inflammation. The rash should go away in a few days to a few weeks. Treatments include:  · Cool, moist compress. Use a clean damp cloth. Put it on the area for 20 to 30 minutes, 5 to 6 times a day for the first 3 days.  · Steroid cream or ointment. You can apply this medicine several times a day on clean skin.  · Oral corticosteroid. Your healthcare provider may prescribe this medicine if you have severe skin symptoms on a large part of your body.  Your healthcare provider may give you a steroid injection instead of pills.  · Oral antihistamine. This medicine can help reduce itching.  · Colloidal oatmeal bath. Soaking in water with colloidal oatmeal can help soothe skin.  · Plain cream, lotion, or ointment. Cream, lotion, or ointment without medicine can help to soothe and protect your skin.  Living with contact dermatitis  Talk with your healthcare provider about what may have caused your contact dermatitis. Patch  testing may help you figure out what caused the rash so you can avoid further contact with it. Once you learn what caused your rash, make sure to avoid that substance. If your skin comes into contact with it again, make sure to wash your skin right away. If you cant avoid the substance, wear gloves or other protective clothing before you touch it. Or use a cream, lotion, or ointment to protect your skin.  When to call your healthcare provider  Call your healthcare provider right away if you have any of these:  · Fever of 100.4°F (38°C) or higher, or as directed  · Symptoms that dont get better, or get worse  · New symptoms   Date Last Reviewed: 5/1/2016  © 1504-6450 [employer]. [address] [place] [address], [place] [address]. All rights reserved. This information is not intended as a substitute for professional medical care. Always follow your healthcare professional's instructions.

## 2024-06-10 NOTE — PHYSICAL EXAM NEUROLOGIC:
oriented to person, place and time
"I personally performed the services described in the documentation  recorded by the scribe in my presence, and it accurately and completely records my words and action.”

## 2024-06-14 ENCOUNTER — TELEPHONE ENCOUNTER (OUTPATIENT)
Dept: URBAN - METROPOLITAN AREA CLINIC 113 | Facility: CLINIC | Age: 79
End: 2024-06-14

## 2024-06-14 ENCOUNTER — LAB OUTSIDE AN ENCOUNTER (OUTPATIENT)
Dept: URBAN - METROPOLITAN AREA CLINIC 113 | Facility: CLINIC | Age: 79
End: 2024-06-14

## 2024-06-14 RX ORDER — DICYCLOMINE HYDROCHLORIDE 20 MG/1
1 TABLET TABLET ORAL THREE TIMES A DAY
Qty: 90 | OUTPATIENT
Start: 2024-06-14 | End: 2024-07-14

## 2024-06-17 ENCOUNTER — TELEPHONE ENCOUNTER (OUTPATIENT)
Dept: URBAN - METROPOLITAN AREA CLINIC 113 | Facility: CLINIC | Age: 79
End: 2024-06-17

## 2024-12-10 ENCOUNTER — OFFICE VISIT (OUTPATIENT)
Dept: URBAN - METROPOLITAN AREA CLINIC 113 | Facility: CLINIC | Age: 79
End: 2024-12-10
Payer: MEDICARE

## 2024-12-10 VITALS
SYSTOLIC BLOOD PRESSURE: 184 MMHG | DIASTOLIC BLOOD PRESSURE: 71 MMHG | HEART RATE: 68 BPM | TEMPERATURE: 97.5 F | HEIGHT: 61 IN | WEIGHT: 140.2 LBS | BODY MASS INDEX: 26.47 KG/M2 | RESPIRATION RATE: 18 BRPM

## 2024-12-10 DIAGNOSIS — K21.9 GASTRO-ESOPHAGEAL REFLUX DISEASE WITHOUT ESOPHAGITIS: ICD-10-CM

## 2024-12-10 DIAGNOSIS — R10.11 RIGHT UPPER QUADRANT PAIN: ICD-10-CM

## 2024-12-10 DIAGNOSIS — K44.9 DIAPHRAGMATIC HERNIA WITHOUT OBSTRUCTION OR GANGRENE: ICD-10-CM

## 2024-12-10 DIAGNOSIS — K76.0 FATTY LIVER: ICD-10-CM

## 2024-12-10 PROCEDURE — 99213 OFFICE O/P EST LOW 20 MIN: CPT

## 2024-12-10 RX ORDER — PANTOPRAZOLE SODIUM 20 MG/1
1 TABLET TABLET, DELAYED RELEASE ORAL TWICE A DAY
Qty: 180 TABLET | Refills: 3 | OUTPATIENT

## 2024-12-10 RX ORDER — LEVOTHYROXINE SODIUM 100 UG/1
1 TABLET IN THE MORNING ON AN EMPTY STOMACH TABLET ORAL ONCE A DAY
Status: ACTIVE | COMMUNITY

## 2024-12-10 RX ORDER — TETRACYCLINE HCL 500 MG
AS DIRECTED CAPSULE ORAL
Status: ACTIVE | COMMUNITY

## 2024-12-10 RX ORDER — PANTOPRAZOLE SODIUM 20 MG/1
1 TABLET TABLET, DELAYED RELEASE ORAL TWICE A DAY
Qty: 180 TABLET | Refills: 3 | Status: ON HOLD | COMMUNITY

## 2024-12-10 RX ORDER — FOLIC ACID 1 MG/1
1 TABLET TABLET ORAL ONCE A DAY
Status: ACTIVE | COMMUNITY

## 2024-12-10 RX ORDER — METHOTREXATE 25 MG/ML
AS DIRECTED INJECTION, SOLUTION INTRA-ARTERIAL; INTRAMUSCULAR; INTRAVENOUS
Status: ON HOLD | COMMUNITY

## 2024-12-10 RX ORDER — ONDANSETRON 8 MG/1
1 TABLET ON THE TONGUE AND ALLOW TO DISSOLVE  AS NEEDED TABLET, ORALLY DISINTEGRATING ORAL EVERY 6 HOURS
Qty: 60 TABLETS | Refills: 3 | Status: ON HOLD | COMMUNITY
Start: 2023-04-21

## 2024-12-10 RX ORDER — PRAVASTATIN SODIUM 10 MG/1
1 TABLET TABLET ORAL ONCE A DAY
Status: ACTIVE | COMMUNITY

## 2024-12-10 RX ORDER — PREDNISONE 5 MG/1
1 TABLET TABLET ORAL ONCE A DAY
Status: ACTIVE | COMMUNITY

## 2024-12-10 RX ORDER — CLOPIDOGREL BISULFATE 75 MG/1
1 TABLET TABLET ORAL ONCE A DAY
Status: ACTIVE | COMMUNITY

## 2024-12-10 RX ORDER — CYANOCOBALAMIN (VITAMIN B-12) 500 MCG
1 TABLET TABLET ORAL ONCE A DAY
Status: ACTIVE | COMMUNITY

## 2024-12-10 RX ORDER — GINGER ROOT/GINGER ROOT EXT 262.5 MG
1 TABLET WITH A MEAL CAPSULE ORAL ONCE A DAY
Status: ACTIVE | COMMUNITY

## 2024-12-10 RX ORDER — LOSARTAN POTASSIUM 25 MG/1
1 TABLET TABLET ORAL ONCE A DAY
Status: ACTIVE | COMMUNITY

## 2024-12-10 RX ORDER — PANTOPRAZOLE SODIUM 20 MG/1
1 TABLET TABLET, DELAYED RELEASE ORAL TWICE A DAY
Qty: 180 TABLET | Refills: 3 | Status: ACTIVE | COMMUNITY

## 2024-12-10 NOTE — HPI-TODAY'S VISIT:
Ms. Wen is a 79-year-old woman past medical history significant for peripheral vascular disease on Plavix, rheumatoid arthritis on prednisone methotrexate, hypothyroidism presenting for follow-up regarding her intermittent postprandial diarrhea.  She was last in office on 6/10/2024 for follow-up after EGD which showed mild reactive gastropathy and a 6 cm hiatal hernia.  She is to continue twice daily PPI therapy with Zofran as needed.  She may complaints of intermittent postprandial diarrhea she will attempt to follow-up food assess dietary triggers and work to avoid these.  Currently lactose and gluten free.  Pending clinical course consider colonoscopy.  After visit in June it appears she had an ER visit to be from [facility] for evaluation of right upper quadrant pain that awoken her from her sleep.  Underwent abdominal ultrasound which had positive Gooden sign noted.  CT showed no thickened gallbladder wall her liver enzymes were normal.  Duodenal diverticulum noted.  Is recommended patient follow-up with GI.  Patient and spouse report that after her ER visit they were evaluated by Dr. Linares who recommended a chest x-ray which showed no acute cardiopulmonary process however it did show a minimally displaced fracture of the right seventh rib.  Slight cortical contour abnormality of the lateral right fifth rib which could represent a nondisplaced fracture.  She reports that she still does experience pain however, we able to identify an etiology has been helpful.  Regarding her postprandial diarrhea she was recently taken off her methotrexate and has noticed improvement in that.  Weight has been stable since last office visit.  GERD symptoms well-controlled on pantoprazole 20 mg twice daily.

## 2024-12-12 ENCOUNTER — TELEPHONE ENCOUNTER (OUTPATIENT)
Dept: URBAN - METROPOLITAN AREA CLINIC 113 | Facility: CLINIC | Age: 79
End: 2024-12-12

## 2024-12-12 RX ORDER — PANTOPRAZOLE SODIUM 20 MG/1
1 TABLET TABLET, DELAYED RELEASE ORAL TWICE A DAY
Qty: 180 | Refills: 3

## 2025-02-11 ENCOUNTER — LAB OUTSIDE AN ENCOUNTER (OUTPATIENT)
Dept: URBAN - METROPOLITAN AREA CLINIC 113 | Facility: CLINIC | Age: 80
End: 2025-02-11

## 2025-02-11 ENCOUNTER — OFFICE VISIT (OUTPATIENT)
Dept: URBAN - METROPOLITAN AREA CLINIC 113 | Facility: CLINIC | Age: 80
End: 2025-02-11
Payer: MEDICARE

## 2025-02-11 VITALS
DIASTOLIC BLOOD PRESSURE: 76 MMHG | SYSTOLIC BLOOD PRESSURE: 194 MMHG | HEIGHT: 61 IN | WEIGHT: 143.6 LBS | HEART RATE: 67 BPM | RESPIRATION RATE: 20 BRPM | BODY MASS INDEX: 27.11 KG/M2 | TEMPERATURE: 97.3 F

## 2025-02-11 DIAGNOSIS — R10.84 GENERALIZED ABDOMINAL PAIN: ICD-10-CM

## 2025-02-11 DIAGNOSIS — K59.00 CONSTIPATION, UNSPECIFIED CONSTIPATION TYPE: ICD-10-CM

## 2025-02-11 PROBLEM — 14760008: Status: ACTIVE | Noted: 2025-02-11

## 2025-02-11 PROCEDURE — 99214 OFFICE O/P EST MOD 30 MIN: CPT

## 2025-02-11 RX ORDER — ONDANSETRON 8 MG/1
1 TABLET ON THE TONGUE AND ALLOW TO DISSOLVE  AS NEEDED TABLET, ORALLY DISINTEGRATING ORAL EVERY 6 HOURS
Qty: 60 TABLETS | Refills: 3 | Status: ON HOLD | COMMUNITY
Start: 2023-04-21

## 2025-02-11 RX ORDER — PANTOPRAZOLE SODIUM 20 MG/1
1 TABLET TABLET, DELAYED RELEASE ORAL TWICE A DAY
Qty: 180 TABLET | Refills: 3 | Status: ACTIVE | COMMUNITY

## 2025-02-11 RX ORDER — PREDNISONE 5 MG/1
1 TABLET TABLET ORAL ONCE A DAY
Status: ACTIVE | COMMUNITY

## 2025-02-11 RX ORDER — CLOPIDOGREL BISULFATE 75 MG/1
1 TABLET TABLET ORAL ONCE A DAY
Status: ACTIVE | COMMUNITY

## 2025-02-11 RX ORDER — CYANOCOBALAMIN (VITAMIN B-12) 500 MCG
1 TABLET TABLET ORAL ONCE A DAY
Status: ACTIVE | COMMUNITY

## 2025-02-11 RX ORDER — GINGER ROOT/GINGER ROOT EXT 262.5 MG
1 TABLET WITH A MEAL CAPSULE ORAL ONCE A DAY
Status: ACTIVE | COMMUNITY

## 2025-02-11 RX ORDER — LEVOTHYROXINE SODIUM 100 UG/1
1 TABLET IN THE MORNING ON AN EMPTY STOMACH TABLET ORAL ONCE A DAY
Status: ACTIVE | COMMUNITY

## 2025-02-11 RX ORDER — FOLIC ACID 1 MG/1
1 TABLET TABLET ORAL ONCE A DAY
Status: ACTIVE | COMMUNITY

## 2025-02-11 RX ORDER — PANTOPRAZOLE SODIUM 20 MG/1
1 TABLET TABLET, DELAYED RELEASE ORAL TWICE A DAY
Qty: 180 | Refills: 3 | Status: ACTIVE | COMMUNITY

## 2025-02-11 RX ORDER — TETRACYCLINE HCL 500 MG
AS DIRECTED CAPSULE ORAL
Status: ACTIVE | COMMUNITY

## 2025-02-11 RX ORDER — PRAVASTATIN SODIUM 10 MG/1
1 TABLET TABLET ORAL ONCE A DAY
Status: ACTIVE | COMMUNITY

## 2025-02-11 RX ORDER — METHOTREXATE 25 MG/ML
AS DIRECTED INJECTION, SOLUTION INTRA-ARTERIAL; INTRAMUSCULAR; INTRAVENOUS
Status: ON HOLD | COMMUNITY

## 2025-02-11 RX ORDER — LOSARTAN POTASSIUM 25 MG/1
1 TABLET TABLET ORAL ONCE A DAY
Status: ACTIVE | COMMUNITY

## 2025-02-11 RX ORDER — POLYETHYLENE GLYCOL 3350, SODIUM SULFATE ANHYDROUS, SODIUM BICARBONATE, SODIUM CHLORIDE, POTASSIUM CHLORIDE 236; 22.74; 6.74; 5.86; 2.97 G/4L; G/4L; G/4L; G/4L; G/4L
2000 ML POWDER, FOR SOLUTION ORAL
Qty: 4000 ML | Refills: 0 | OUTPATIENT
Start: 2025-02-11 | End: 2025-02-12

## 2025-02-11 NOTE — HPI-TODAY'S VISIT:
Ms. Wen is a 79-year-old woman with past medical history significant for peripheral vascular disease on Plavix, rheumatoid arthritis on prednisone and methotrexate, hypothyroidism presenting for short interval follow-up regarding abdominal pain.  She was last seen in office on 12/10/2024  for follow-up regarding her GERD symptoms and postprandial diarrhea.  Her diarrhea has overall resolved with avoidance of dietary triggers and medication change.  Further recommendations for treatment.  Symptoms improved since discontinuing the methotrexate, however her RA has been worsening, she will see her rheumatologist regarding her GERD symptoms well-controlled with pantoprazole 20 mg twice daily.  Regarding the right upper quadrant pain, this improved she has found to have right sided rib fractures.  She presents for follow-up after EGD which showed mild reactive gastropathy and 6 cm hiatal hernia, otherwise unremarkable.  She continue twice daily PPI therapy with Zofran as needed.  New complaints of postprandial diarrhea for which she will attempt to keep food diary to assess dietary triggers and work to avoid these.  Currently lactose and gluten free.  Pending clinical course consider colonoscopy.  Today she presents with her spouse and she reports that she has been having worsening abdominal pain, she describes as a dull ache, it is constant, but severe pain comes in waves.  She endorses nausea without vomiting.  She has had some constipation.  Denies medic easier or melena.

## 2025-02-12 ENCOUNTER — TELEPHONE ENCOUNTER (OUTPATIENT)
Dept: URBAN - METROPOLITAN AREA CLINIC 113 | Facility: CLINIC | Age: 80
End: 2025-02-12

## 2025-02-16 ENCOUNTER — TELEPHONE ENCOUNTER (OUTPATIENT)
Dept: URBAN - METROPOLITAN AREA CLINIC 113 | Facility: CLINIC | Age: 80
End: 2025-02-16

## 2025-02-19 ENCOUNTER — LAB OUTSIDE AN ENCOUNTER (OUTPATIENT)
Dept: URBAN - METROPOLITAN AREA CLINIC 113 | Facility: CLINIC | Age: 80
End: 2025-02-19

## 2025-02-26 ENCOUNTER — TELEPHONE ENCOUNTER (OUTPATIENT)
Dept: URBAN - METROPOLITAN AREA CLINIC 113 | Facility: CLINIC | Age: 80
End: 2025-02-26

## 2025-02-27 ENCOUNTER — LAB OUTSIDE AN ENCOUNTER (OUTPATIENT)
Dept: URBAN - METROPOLITAN AREA CLINIC 113 | Facility: CLINIC | Age: 80
End: 2025-02-27

## 2025-03-03 ENCOUNTER — TELEPHONE ENCOUNTER (OUTPATIENT)
Dept: URBAN - METROPOLITAN AREA CLINIC 113 | Facility: CLINIC | Age: 80
End: 2025-03-03

## 2025-03-05 ENCOUNTER — TELEPHONE ENCOUNTER (OUTPATIENT)
Dept: URBAN - METROPOLITAN AREA CLINIC 113 | Facility: CLINIC | Age: 80
End: 2025-03-05

## 2025-03-17 ENCOUNTER — TELEPHONE ENCOUNTER (OUTPATIENT)
Dept: URBAN - METROPOLITAN AREA CLINIC 113 | Facility: CLINIC | Age: 80
End: 2025-03-17

## 2025-03-25 ENCOUNTER — TELEPHONE ENCOUNTER (OUTPATIENT)
Dept: URBAN - METROPOLITAN AREA CLINIC 113 | Facility: CLINIC | Age: 80
End: 2025-03-25

## 2025-05-21 ENCOUNTER — OFFICE VISIT (OUTPATIENT)
Dept: URBAN - METROPOLITAN AREA CLINIC 113 | Facility: CLINIC | Age: 80
End: 2025-05-21

## 2025-05-21 RX ORDER — LOSARTAN POTASSIUM 25 MG/1
1 TABLET TABLET ORAL ONCE A DAY
Status: ACTIVE | COMMUNITY

## 2025-05-21 RX ORDER — GINGER ROOT/GINGER ROOT EXT 262.5 MG
1 TABLET WITH A MEAL CAPSULE ORAL ONCE A DAY
Status: ACTIVE | COMMUNITY

## 2025-05-21 RX ORDER — ONDANSETRON 8 MG/1
1 TABLET ON THE TONGUE AND ALLOW TO DISSOLVE  AS NEEDED TABLET, ORALLY DISINTEGRATING ORAL EVERY 6 HOURS
Qty: 60 TABLETS | Refills: 3 | Status: ON HOLD | COMMUNITY
Start: 2023-04-21

## 2025-05-21 RX ORDER — CYANOCOBALAMIN (VITAMIN B-12) 500 MCG
1 TABLET TABLET ORAL ONCE A DAY
Status: ACTIVE | COMMUNITY

## 2025-05-21 RX ORDER — TETRACYCLINE HCL 500 MG
AS DIRECTED CAPSULE ORAL
Status: ACTIVE | COMMUNITY

## 2025-05-21 RX ORDER — PREDNISONE 5 MG/1
1 TABLET TABLET ORAL ONCE A DAY
Status: ACTIVE | COMMUNITY

## 2025-05-21 RX ORDER — LEVOTHYROXINE SODIUM 100 UG/1
1 TABLET IN THE MORNING ON AN EMPTY STOMACH TABLET ORAL ONCE A DAY
Status: ACTIVE | COMMUNITY

## 2025-05-21 RX ORDER — PRAVASTATIN SODIUM 10 MG/1
1 TABLET TABLET ORAL ONCE A DAY
Status: ACTIVE | COMMUNITY

## 2025-05-21 RX ORDER — PRAVASTATIN SODIUM 10 MG/1
2 TABLETS TABLET ORAL ONCE A DAY
Status: ACTIVE | COMMUNITY

## 2025-05-21 RX ORDER — CLOPIDOGREL BISULFATE 75 MG/1
1 TABLET TABLET ORAL ONCE A DAY
Status: ACTIVE | COMMUNITY

## 2025-05-21 RX ORDER — PANTOPRAZOLE SODIUM 20 MG/1
1 TABLET TABLET, DELAYED RELEASE ORAL TWICE A DAY
Qty: 180 | Refills: 3 | Status: ACTIVE | COMMUNITY

## 2025-05-21 RX ORDER — METHOTREXATE 25 MG/ML
AS DIRECTED INJECTION, SOLUTION INTRA-ARTERIAL; INTRAMUSCULAR; INTRAVENOUS
Status: ON HOLD | COMMUNITY

## 2025-05-21 RX ORDER — FOLIC ACID 1 MG/1
1 TABLET TABLET ORAL ONCE A DAY
Status: ACTIVE | COMMUNITY

## 2025-05-21 NOTE — HPI-TODAY'S VISIT:
Ms. Wen is a 79-year-old woman with past medical history significant for peripheral vascular disease on Plavix, rheumatoid arthritis on prednisone and methotrexate, hypothyroidism presenting for short interval follow-up regarding abdominal pain. details…

## 2025-05-30 ENCOUNTER — TELEPHONE ENCOUNTER (OUTPATIENT)
Dept: URBAN - METROPOLITAN AREA CLINIC 113 | Facility: CLINIC | Age: 80
End: 2025-05-30